# Patient Record
Sex: MALE | Race: WHITE | NOT HISPANIC OR LATINO | Employment: OTHER | URBAN - METROPOLITAN AREA
[De-identification: names, ages, dates, MRNs, and addresses within clinical notes are randomized per-mention and may not be internally consistent; named-entity substitution may affect disease eponyms.]

---

## 2018-07-09 ENCOUNTER — OFFICE VISIT (OUTPATIENT)
Dept: ORTHOPEDICS | Facility: CLINIC | Age: 38
End: 2018-07-09
Payer: MEDICAID

## 2018-07-09 VITALS
WEIGHT: 159 LBS | BODY MASS INDEX: 24.96 KG/M2 | HEIGHT: 67 IN | SYSTOLIC BLOOD PRESSURE: 108 MMHG | DIASTOLIC BLOOD PRESSURE: 63 MMHG

## 2018-07-09 DIAGNOSIS — M25.561 CHRONIC PAIN OF RIGHT KNEE: Primary | ICD-10-CM

## 2018-07-09 DIAGNOSIS — M25.561 RIGHT KNEE PAIN, UNSPECIFIED CHRONICITY: Primary | ICD-10-CM

## 2018-07-09 DIAGNOSIS — G89.29 CHRONIC PAIN OF RIGHT KNEE: Primary | ICD-10-CM

## 2018-07-09 PROCEDURE — 99999 PR PBB SHADOW E&M-NEW PATIENT-LVL III: CPT | Mod: PBBFAC,,, | Performed by: ORTHOPAEDIC SURGERY

## 2018-07-09 PROCEDURE — 99203 OFFICE O/P NEW LOW 30 MIN: CPT | Mod: PBBFAC,PN | Performed by: ORTHOPAEDIC SURGERY

## 2018-07-09 PROCEDURE — 99203 OFFICE O/P NEW LOW 30 MIN: CPT | Mod: S$PBB,,, | Performed by: ORTHOPAEDIC SURGERY

## 2018-07-09 RX ORDER — NAPROXEN 500 MG/1
500 TABLET ORAL 2 TIMES DAILY
Qty: 60 TABLET | Refills: 1 | Status: SHIPPED | OUTPATIENT
Start: 2018-07-09 | End: 2019-02-10

## 2018-07-09 NOTE — PROGRESS NOTES
"Subjective:      Patient ID: Ralf Cuenca is a 37 y.o. male.    Chief Complaint: Pain of the Right Knee      HPI: Ralf Cuenca is referred by Dr. Swan.  He is here today for initial visit with complaints of right knee pain.  He has experienced problems with their bilateral knee over the past 2 years. However, recently only the right knee causes pain. The patient reports history of injury/aggravation 2 years ago. He explains he was running from the police when he jumped a fence and fell into a ditch from which she obtained 2 deep skin wounds.  Patient reports he was treated for this, and did not have a joint infection or osteomyelitis. Pain is located medially and  anteriorly Associated symptoms include giving way and gelling. He has been treated with over the counter analgesics and physitherapy.  Patient reports symptoms improved at physical therapy.  Ambulation reportedly has not been impaired. Self care ADLs are not painful.     No past medical history on file.    Current Outpatient Prescriptions:     naproxen (NAPROSYN) 500 MG tablet, Take 1 tablet (500 mg total) by mouth 2 (two) times daily., Disp: 60 tablet, Rfl: 1  Review of patient's allergies indicates:   Allergen Reactions    Benadryl [diphenhydramine hcl]        /63   Ht 5' 7" (1.702 m)   Wt 72.1 kg (159 lb)   BMI 24.90 kg/m²     Review of Systems   Constitution: Negative for chills and fever.   Cardiovascular: Negative for chest pain and palpitations.   Respiratory: Negative for shortness of breath and wheezing.    Skin: Negative for poor wound healing and rash.   Musculoskeletal: Positive for joint pain. Negative for arthritis, falls, joint swelling, muscle weakness and stiffness.   Gastrointestinal: Negative for nausea and vomiting.   Neurological: Negative for seizures and tremors.   Psychiatric/Behavioral: Negative for altered mental status.         Objective:       Vitals:    07/09/18 1310   BP: 108/63    Ortho Exam     right " KNEE:  The patient is not in acute distress.   Body habitus is normal.   The patient walks without a limp.  Resisted SLR negative.   The skin over the knee has a healed scar posterior medially 1.5 x 1.5 cm. As well as anterior laterally 0.5 x 0.5 cm.  Knee effusion none   Tendernes is located medial and anterior  Range of motion- Flexion 145 deg, Extension 0 deg,   Ligament exam:   MCL intact   Lachman intact              Post sag intact    LCL intact  Patellar apprehension negative.  Popliteal cyst negative  Patellar crepitation present.  Flexion/pinch negative.  Pulses DP present, PT present.  Motor normal 5/5 strength in all tested muscle groups.   Sensory normal.    GEN: Well developed, well nourished male. AAOX3. No acute distress.   Breathing unlabored.  Mood and affect normal.         Assessment:     Imaging:  I personally reviewed the radiographs of the right knee which reveal no acute or chronic abnormalities.      1. Chronic pain of right knee          Plan:     Structurally the knee is stable.   There is a possibility of a meniscal tear causing these symptoms. Patient reports improvement with physical therapy in the past.  I have recommended restarting physical therapy.  If symptoms do not continue to improve we may consider MRI in the future to evaluate this.    Start Naprosyn, GI precautions given.    Orders Placed This Encounter    Ambulatory Referral to Physical/Occupational Therapy    naproxen (NAPROSYN) 500 MG tablet     Follow-up in about 2 months (around 9/9/2018).

## 2018-07-09 NOTE — LETTER
July 9, 2018      Edin Swan MD  [address] [place] [address]           [facility]  [address]  [place] [address]  Phone: [phone]  Fax: [phone]          Patient: Ralf Cuenca   MR Number: [id number]   YOB: 1980   Date of Visit: 7/9/2018       Dear Dr. Edin Swan:    Thank you for referring Ralf Cuenca to me for evaluation. Attached you will find relevant portions of my assessment and plan of care.    If you have questions, please do not hesitate to call me. I look forward to following Ralf Cuenca along with you.    Sincerely,    Karissa Callahan PA-C    Enclosure  CC:  No Recipients    If you would like to receive this communication electronically, please contact [email or web address] or [phone] to request more information on [facility] access.    For providers and/or their staff who would like to refer a patient to [facility], please contact us through our one-stop-shop provider referral line, [facility], at [phone].    If you feel you have received this communication in error or would no longer like to receive these types of communications, please e-mail [email or web address]

## 2018-11-05 ENCOUNTER — TELEPHONE (OUTPATIENT)
Dept: ORTHOPEDICS | Facility: CLINIC | Age: 38
End: 2018-11-05

## 2019-02-10 ENCOUNTER — HOSPITAL ENCOUNTER (EMERGENCY)
Facility: HOSPITAL | Age: 39
Discharge: HOME | End: 2019-02-10
Attending: EMERGENCY MEDICINE
Payer: MEDICAID

## 2019-02-10 VITALS
DIASTOLIC BLOOD PRESSURE: 72 MMHG | TEMPERATURE: 100 F | HEART RATE: 77 BPM | RESPIRATION RATE: 16 BRPM | HEIGHT: 67 IN | SYSTOLIC BLOOD PRESSURE: 120 MMHG | BODY MASS INDEX: 24.01 KG/M2 | WEIGHT: 153 LBS | OXYGEN SATURATION: 99 %

## 2019-02-10 DIAGNOSIS — M70.61 TROCHANTERIC BURSITIS OF RIGHT HIP: Primary | ICD-10-CM

## 2019-02-10 DIAGNOSIS — M25.551 RIGHT HIP PAIN: ICD-10-CM

## 2019-02-10 PROCEDURE — 96372 THER/PROPH/DIAG INJ SC/IM: CPT

## 2019-02-10 PROCEDURE — 99284 EMERGENCY DEPT VISIT MOD MDM: CPT | Mod: ,,, | Performed by: EMERGENCY MEDICINE

## 2019-02-10 PROCEDURE — 99284 EMERGENCY DEPT VISIT MOD MDM: CPT | Mod: 25

## 2019-02-10 PROCEDURE — 63600175 PHARM REV CODE 636 W HCPCS: Performed by: PHYSICIAN ASSISTANT

## 2019-02-10 PROCEDURE — 99284 PR EMERGENCY DEPT VISIT,LEVEL IV: ICD-10-PCS | Mod: ,,, | Performed by: EMERGENCY MEDICINE

## 2019-02-10 RX ORDER — KETOROLAC TROMETHAMINE 30 MG/ML
15 INJECTION, SOLUTION INTRAMUSCULAR; INTRAVENOUS
Status: COMPLETED | OUTPATIENT
Start: 2019-02-10 | End: 2019-02-10

## 2019-02-10 RX ADMIN — KETOROLAC TROMETHAMINE 15 MG: 30 INJECTION, SOLUTION INTRAMUSCULAR at 06:02

## 2019-02-10 NOTE — ED NOTES
LOC: The patient is awake, alert and aware of environment with an appropriate affect, the patient is oriented x 3 and speaking appropriately.  APPEARANCE: Patient resting comfortably and in no acute distress, patient is clean and well groomed, patient's clothing is properly fastened.  SKIN: The skin is warm and dry, color consistent with ethnicity, patient has normal skin turgor and moist mucus membranes, skin intact, no breakdown or bruising noted.  MUSCULOSKELETAL: Patient moving all extremities spontaneously, no obvious swelling or deformities noted.  Tenderness to right hip joint on palpation  RESPIRATORY: Airway is open and patent, respirations are spontaneous, patient has a normal effort and rate, no accessory muscle use noted.  NEUROLOGIC:  facial expression is symmetrical, patient moving all extremities spontaneously, normal sensation in all extremities when touched with a finger.  Follows all commands appropriately.

## 2019-02-10 NOTE — ED TRIAGE NOTES
Pt with right hip pain that started yesterday while playing with nephew.  Pt denies injury.  Pt states very difficult to walk.  Pt denies numbness/tingling to leg.  Pt states burning at the site of pain.  Pain increases with change of position.

## 2019-02-11 NOTE — ED PROVIDER NOTES
"Encounter Date: 2/10/2019       History     Chief Complaint   Patient presents with    Hip Pain     right hip pain, since yesterday, worse today, had to limp, denies trauma or injury      6:05 PM    Patient is a 38-year-old male with no significant past medical history who presents the ED with right hip pain.  Patient states that he noted his pain yesterday.  He states that at the time he was running around with his nephew, having a play-gun fight.  No significant injury or trauma.  His pain progressively became worse which prompted him to come into the ED.  He is complaining of no pain at rest, but when he moves a certain way his pain becomes severe and is "burning" in nature. His pain is located to his R lateral hip with intermittent radiation down the lateral side.  He took ibuprofen 200 mg at 1500 PTA.   He denies previous history of right hip pain. He has a hx of back pain, but not currently.  No significant family history per patient and mother. He also notes a rash for 3 weeks to his hands and feet that has been evaluated by his PCP and recently biopsied by dermatology. Suture noted in place to medial R foot.          Review of patient's allergies indicates:   Allergen Reactions    Benadryl [diphenhydramine hcl]      No past medical history on file.  No past surgical history on file.  History reviewed. No pertinent family history.  Social History     Tobacco Use    Smoking status: Current Every Day Smoker    Smokeless tobacco: Current User   Substance Use Topics    Alcohol use: Not on file    Drug use: Not on file     Review of Systems   Constitutional: Negative for fever.   HENT: Negative for sore throat.    Respiratory: Negative for shortness of breath.    Cardiovascular: Negative for chest pain.   Gastrointestinal: Negative for nausea.   Genitourinary: Negative for dysuria.   Musculoskeletal: Positive for arthralgias. Negative for back pain.   Skin: Positive for rash.   Neurological: Negative for " weakness.   Hematological: Does not bruise/bleed easily.       Physical Exam     Initial Vitals [02/10/19 1542]   BP Pulse Resp Temp SpO2   108/73 91 18 98.2 °F (36.8 °C) 98 %      MAP       --         Physical Exam    Vitals reviewed.  Constitutional: He appears well-developed and well-nourished. He is not diaphoretic. No distress.   HENT:   Head: Normocephalic and atraumatic.   Nose: Nose normal.   Eyes: Conjunctivae and EOM are normal.   Neck: Normal range of motion.   Cardiovascular: Normal rate, regular rhythm and normal heart sounds. Exam reveals no friction rub.    No murmur heard.  Pulses:       Dorsalis pedis pulses are 2+ on the right side, and 2+ on the left side.   Pulmonary/Chest: Breath sounds normal. No respiratory distress. He has no wheezes. He has no rales.   Abdominal: Soft. Bowel sounds are normal. He exhibits no distension. There is no tenderness.   Musculoskeletal: Normal range of motion.        Right hip: He exhibits tenderness. He exhibits normal range of motion, normal strength, no bony tenderness, no swelling and no deformity.        Legs:  R hip pain exacerbated with abduction which limited ROM, otherwise intact in other directions. Skin without erythema, ecchymosis, rashes, wounds, warmth.   Neurological: He is alert and oriented to person, place, and time. He has normal strength. No sensory deficit.   Skin: Skin is warm and dry. Rash noted. No erythema.   Rash noted to bilateral hands and feet. 1 suture noted to medial R foot. No signs of cellulitis.    Psychiatric: He has a normal mood and affect. Thought content normal.         ED Course   Procedures  Labs Reviewed - No data to display       Imaging Results          X-Ray Hip 2 View Right (Final result)  Result time 02/10/19 19:34:07    Final result by Tino Wise MD (02/10/19 19:34:07)                 Impression:      No acute bony abnormality.      Electronically signed by: Tino Wise  MD  Date:    02/10/2019  Time:    19:34             Narrative:    EXAMINATION:  XR HIP 2 VIEW RIGHT    CLINICAL HISTORY:  Pain in right hip.    TECHNIQUE:  AP view of the pelvis and frog leg lateral view of the right hip were performed.    COMPARISON:  None.    FINDINGS:  No evidence of acute fracture or dislocation.  Hips are symmetric.  No radiopaque foreign body.                                 Medical Decision Making:   History:   Old Medical Records: I decided to obtain old medical records.  Initial Assessment:   Patient is a 38-year-old male that presents the ED with right lateral hip pain worse with movement.  No significant injury or trauma.  He was playing with his nephew when he started to developed his pain.  Differential Diagnosis:   Includes but is not limited to bursitis, ITB band syndrome, and less likely fractures. Low suspicion for AVN given that pain is only appreciated with certain movements. No pain at rest.   Clinical Tests:   Radiological Study: Ordered and Reviewed  ED Management:  X-rays shows no evidence of acute fracture or dislocation.  Hips are symmetric.  No radiopaque foreign body.    Patient updated with results.  His symptoms most likely due to bursitis.  He was given a Toradol IM injection here for anti-inflammatory effects.  He is to continue ibuprofen on a scheduled basis for pain relief.  Follow up with sports medicine if symptoms do not improved.  All questions were answered.  Patient is comfortable with plan and stable for discharge.      I have reviewed patient's chart and discussed this case with my supervising MD.     Lita Oneal PA-C  Emergent Department  [facility]  Spectralink #[id number] or #[id number]                        Clinical Impression:   The primary encounter diagnosis was Trochanteric bursitis of right hip. A diagnosis of Right hip pain was also pertinent to this visit.      Disposition:   Disposition: Discharged  Condition: Stable                         Lita Oneal PA-C  02/11/19 0120

## 2019-02-11 NOTE — DISCHARGE INSTRUCTIONS
Continue ibuprofen 400 mg every 6 hours as needed for pain relief. Rest. Avoid activity that exacerbates pain, but avoid prolong bed rest.     Call and follow up with sports medicine if your symptoms do not improve or worsen in 7 days or return to the emergency department for any concerning signs or symptoms.    No future appointments.    Our goal in the emergency department is to always give you outstanding care and exceptional service. You may receive a survey by mail or e-mail in the next week regarding your experience in our ED. We would greatly appreciate your completing and returning the survey. Your feedback provides us with a way to recognize our staff who give very good care and it helps us learn how to improve when your experience was below our aspiration of excellence.

## 2022-06-09 ENCOUNTER — TELEPHONE (OUTPATIENT)
Dept: ORTHOPEDICS | Facility: CLINIC | Age: 42
End: 2022-06-09
Payer: MEDICAID

## 2022-06-09 DIAGNOSIS — M25.531 RIGHT WRIST PAIN: Primary | ICD-10-CM

## 2022-06-09 NOTE — PROGRESS NOTES
"Subjective:      Patient ID: Ralf Cuenca is a 41 y.o. male.    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 Pain has improved since injury. He has intermittent ulnar sided right wrist pain that is worse with flexion. He notes clicking and popping in his wrist. Pain is worse also with lifting. No numbness or tingling. He has stiffness in the fingers. He is right hand dominant.     He has been wearing a splint and wrapping wrist with ACE. He is babying his right arm. No OT, injections, or surgery on his right wrist. He has taken motrin with some relief.       History reviewed. No pertinent past medical history.      Current Outpatient Medications:     ibuprofen (ADVIL,MOTRIN) 800 MG tablet, Take 1 tablet (800 mg total) by mouth 3 (three) times daily after meals., Disp: 90 tablet, Rfl: 2    Review of patient's allergies indicates:   Allergen Reactions    Benadryl [diphenhydramine hcl]        Review of Systems   Constitutional: Negative for chills, fever, night sweats and weight gain.   Gastrointestinal: Negative for bowel incontinence, nausea and vomiting.   Genitourinary: Negative for bladder incontinence.   Neurological: Negative for disturbances in coordination and loss of balance.         Objective:        Ht 5' 7" (1.702 m)   Wt 71.9 kg (158 lb 9.6 oz)   BMI 24.84 kg/m²     General    Vitals reviewed.  Constitutional: He is oriented to person, place, and time. He appears well-developed and well-nourished.   Pulmonary/Chest: Effort normal.   Abdominal: He exhibits no distension.   Neurological: He is alert and oriented to person, place, and time.   Psychiatric: He has a normal mood and affect. His behavior is normal. Judgment and thought content normal.           RIGHT Hand/Wrist Examination:    Observation/Inspection:  Swelling  none    Deformity  none  Discoloration  none "     Scars   none    Atrophy  none    HAND/WRIST EXAMINATION:  Finkelstein's Test   Neg  WHAT Test    Neg  Snuff box tenderness   Neg  Hook of Hamate Tenderness  Neg  CMC grind    Neg    Neurovascular Exam:  Digits WWP, brisk CR < 3s throughout  NVI motor/LTS to M/R/U nerves, radial pulse 2+  Tinel's Test - Carpal Tunnel  Neg  Tinel's Test - Cubital Tunnel  Neg  Phalen's Test    Neg  Median Nerve Compression Test Neg    ROM hand full, painless    Reasonable ROM of wrist with pain at extremes of flexion/extension. Ulnar sided wrist tenderness.       LEFT Hand/Wrist Examination:    Observation/Inspection:  Swelling  none    Deformity  none  Discoloration  none     Scars   none    Atrophy  none    HAND/WRIST EXAMINATION:  Finkelstein's Test   Neg  WHAT Test    Neg  Snuff box tenderness   Neg  Hook of Hamate Tenderness  Neg  CMC grind    Neg    Neurovascular Exam:  Digits WWP, brisk CR < 3s throughout  NVI motor/LTS to M/R/U nerves, radial pulse 2+  Tinel's Test - Carpal Tunnel  Neg  Tinel's Test - Cubital Tunnel  Neg  Phalen's Test    Neg  Median Nerve Compression Test Neg    ROM hand/wrist/elbow full, painless      XRAY INTERPRETATION:   X-rays of right wrist dated 6/10/22 are personally reviewed and show no fracture or dislocation.         Assessment:       Encounter Diagnosis   Name Primary?    Acute pain of right wrist           Plan:       Ralf was seen today for pain.    Diagnoses and all orders for this visit:    Acute pain of right wrist  -     Ambulatory referral/consult to Orthopedics  -     ibuprofen (ADVIL,MOTRIN) 800 MG tablet; Take 1 tablet (800 mg total) by mouth 3 (three) times daily after meals.  -     Ambulatory referral/consult to Physical/Occupational Therapy; Future      4 week history of intermittent ulnar sided right wrist pain that started after he slipped off the back of a [employer] trailer. He has ulnar sided right wrist pain and clicking. He his right hand dominant.     XR of right wrist show no  fracture or dislocation. He has ulnar sided tenderness.     Treatment options reviewed with patient along with above right wrist xrays. Following plan made:      - OT orders for right wrist sent to [facility].   - New prescription for motrin 800mg. Reviewed dosing and side effects. Take with food. Will take bid to tid regularly for next 2 weeks.   - Given gel wrist brace to use prn at home. 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   - If no improvement with above, consider MRI of right wrist to evaluate for TFCC tear.     Follow up in about 6 weeks (around 7/22/2022).

## 2022-06-09 NOTE — TELEPHONE ENCOUNTER
Spoke with pt informing him need of xray prior to visit with Bruna  Tomorrow. Pt states he had xrays completed about 2 weeks ago with Dr Fernandez and will get the records from this and bring with him to appointment. If not he will have them completed tomorrow.     ----- Message from Bruna Larson PA-C sent at 6/9/2022  2:00 PM CDT -----  He has appt with me tomorrow and needs right wrist XRs prior to seeing me. I have ordered them.     Thanks.

## 2022-06-10 ENCOUNTER — OFFICE VISIT (OUTPATIENT)
Dept: ORTHOPEDICS | Facility: CLINIC | Age: 42
End: 2022-06-10
Payer: MEDICAID

## 2022-06-10 VITALS — BODY MASS INDEX: 24.9 KG/M2 | HEIGHT: 67 IN | WEIGHT: 158.63 LBS

## 2022-06-10 DIAGNOSIS — M25.531 ACUTE PAIN OF RIGHT WRIST: ICD-10-CM

## 2022-06-10 PROCEDURE — 99203 PR OFFICE/OUTPT VISIT, NEW, LEVL III, 30-44 MIN: ICD-10-PCS | Mod: S$PBB,,, | Performed by: PHYSICIAN ASSISTANT

## 2022-06-10 PROCEDURE — 99999 PR PBB SHADOW E&M-EST. PATIENT-LVL IV: CPT | Mod: PBBFAC,,, | Performed by: PHYSICIAN ASSISTANT

## 2022-06-10 PROCEDURE — 1160F PR REVIEW ALL MEDS BY PRESCRIBER/CLIN PHARMACIST DOCUMENTED: ICD-10-PCS | Mod: CPTII,,, | Performed by: PHYSICIAN ASSISTANT

## 2022-06-10 PROCEDURE — 1159F PR MEDICATION LIST DOCUMENTED IN MEDICAL RECORD: ICD-10-PCS | Mod: CPTII,,, | Performed by: PHYSICIAN ASSISTANT

## 2022-06-10 PROCEDURE — 3008F BODY MASS INDEX DOCD: CPT | Mod: CPTII,,, | Performed by: PHYSICIAN ASSISTANT

## 2022-06-10 PROCEDURE — 1160F RVW MEDS BY RX/DR IN RCRD: CPT | Mod: CPTII,,, | Performed by: PHYSICIAN ASSISTANT

## 2022-06-10 PROCEDURE — 99203 OFFICE O/P NEW LOW 30 MIN: CPT | Mod: S$PBB,,, | Performed by: PHYSICIAN ASSISTANT

## 2022-06-10 PROCEDURE — 99999 PR PBB SHADOW E&M-EST. PATIENT-LVL IV: ICD-10-PCS | Mod: PBBFAC,,, | Performed by: PHYSICIAN ASSISTANT

## 2022-06-10 PROCEDURE — 1159F MED LIST DOCD IN RCRD: CPT | Mod: CPTII,,, | Performed by: PHYSICIAN ASSISTANT

## 2022-06-10 PROCEDURE — 99214 OFFICE O/P EST MOD 30 MIN: CPT | Mod: PBBFAC,PN | Performed by: PHYSICIAN ASSISTANT

## 2022-06-10 PROCEDURE — 3008F PR BODY MASS INDEX (BMI) DOCUMENTED: ICD-10-PCS | Mod: CPTII,,, | Performed by: PHYSICIAN ASSISTANT

## 2022-06-10 RX ORDER — IBUPROFEN 800 MG/1
800 TABLET ORAL
Qty: 90 TABLET | Refills: 2 | Status: SHIPPED | OUTPATIENT
Start: 2022-06-10 | End: 2022-08-05 | Stop reason: SDUPTHER

## 2022-06-10 NOTE — PATIENT INSTRUCTIONS
It was nice to meet you today! I am sorry that you are hurting.     Your wrist xrays are normal. You may have a soft tissue injury to the wrist that does not show up on xray.     Wear the wrist  brace at work. You can wear as needed for pain when not working.     I sent motrin 800mg to your pharmacy to help with pain/inflammation. Take as directed with food. I would try to take at least twice a day for the next 2 weeks.     I sent occupational therapy orders to [facility]. They should call you to set up, but if not you can call [phone].     If no improvement with above, we can consider an MRI of your wrist.     I will see you back in 6 weeks, but please stay in touch and call me if you need anything. You can also send me a message in [facility].     Bruna   [phone]

## 2022-06-13 ENCOUNTER — CLINICAL SUPPORT (OUTPATIENT)
Dept: REHABILITATION | Facility: HOSPITAL | Age: 42
End: 2022-06-13
Payer: MEDICAID

## 2022-06-13 DIAGNOSIS — R29.898 DECREASED PINCH STRENGTH: ICD-10-CM

## 2022-06-13 DIAGNOSIS — M25.631 DECREASED RANGE OF MOTION OF RIGHT WRIST: ICD-10-CM

## 2022-06-13 DIAGNOSIS — R29.898 DECREASED GRIP STRENGTH OF RIGHT HAND: ICD-10-CM

## 2022-06-13 DIAGNOSIS — M25.531 ACUTE PAIN OF RIGHT WRIST: ICD-10-CM

## 2022-06-13 PROCEDURE — 97165 OT EVAL LOW COMPLEX 30 MIN: CPT | Mod: PN

## 2022-06-13 NOTE — PATIENT INSTRUCTIONS
[facility], OCCUPATIONAL THERAPY  HOME EXERCISE PROGRAM       Complete the following exercises with 10 repetitions each, 2 x/day.     AROM: Supination / Pronation   With your elbow by your side, turn your palm up then turn your palm down.     AROM: Wrist Flexion / Extension               Bend your wrist forward and back as far as possible.      AROM: Wrist Radial / Ulnar Deviation  Bend your wrist from side to side as far as possible.    AROM: Wrist Flexion / Extension  Make a fist, then bend your wrist forward then back as far as possible.         AROM: Wrist Radial / Ulnar Deviation   Make a fist then bend your wrist toward your body, then away.         Copyright © I. All rights reserved.     Therapist: SANTY Martini

## 2022-06-14 NOTE — PLAN OF CARE
[facility] Occupational Therapy  Initial Evaluation     Date:  6/13/2022    Name: Ralf Cuenca  Clinic Number: [id number]    Therapy Diagnosis:   1. Acute pain of right wrist  Ambulatory referral/consult to Physical/Occupational Therapy   2. Decreased  strength of right hand     3. Decreased pinch strength     4. Decreased range of motion of right wrist        Physician: Bruna Larson PA-C     Physician Orders:  Eval and treat with all modalities. Good HEP. 1-2 x per week x 6 weeks.  Medical Diagnosis: Acute pain of right wrist-OT for ulnar sided right wrist pain s/p fall a month ago.  Surgical Procedure and Date: n/a  Evaluation Date: 06/13/2022    Insurance Authorization Period Expiration: tbd  Plan of Care Certification Period: 6/13/22 to 8/22/22  Date of Return to MD: 7/22/22    Visit # / Visits authorized: 1 / 1    Time In:12:13 pm  Time Out: 1:00 pm  Total Billable Time: 47 minutes    Precautions:  Standard    Subjective     Involved Side: right   Any pressure on my hand hurts.   Dominant Side: Right  Date of Onset: over a month ago  Mechanism of Injury: fell off a box truck not sure how I fell on it.   History of Current Condition:  I have been taking the medicine which has helped but if I don't take it it hurts  Surgical Procedure: n/a  Imaging: xray   FINDINGS:  The bones are intact.  There is no evidence for acute fracture or bone destruction.  Joint spaces appear well maintained.  No bony erosions are identified.  Soft tissues are unremarkable.  Impression:  No evidence for acute fracture, bone destruction, or dislocation.  Electronically signed by: Marino Mejia MD  Date:                                            06/10/2022    Previous Therapy: none    Past Medical History/Physical Systems Review:   Ralf Cuenca  has no past medical history on file.    Ralf Cuenca  has no past surgical history on file.    Ralf has a current medication list which includes the following  prescription(s): ibuprofen.    Review of patient's allergies indicates:   Allergen Reactions    Benadryl [diphenhydramine hcl]         Patient's Goals for Therapy:  See if I can get my hand better.     Pain:  Functional Pain Scale Rating 0-10:   5/10 on average  0/10 at best  7/10 at worst  Location: ulnar wrist   Description: stiff  Aggravating Factors: deviation  Easing Factors: splint, putting pressure on it    Occupation:  [employer], seafood, [employer]  Working presently: self-employed  Duties: all physical work     Functional Limitations/Social History:    Previous functional status includes: Independent with all ADLs.     Current FunctionalStatus   Home/Living environment : lives with their family      Limitation of Functional Status as follows:   ADLs/IADLs:     - Feeding: modified    - Bathing: modified    - Dressing/Grooming: I but careful    - Driving: I     Leisure: throw darts, go to [place]    Objective    Observation: Skin intact and no Deformities noted    Sensation: Ulnar and Median Nerve  Intact  [place] Mauro Monofilament Test: No  Stereognosis: Intact    Special Tests:   Positive TFCC stress test and Press test    Wound Assessment: n/a    Edema: Circumferential measurements: none noted in wrist and hand    Range of Motion: right Active WFL  Thumb Opposition: to all tips and 5th MCP head  Palmar Abduction: symmetric  Radial Abduction: symmetric                             Left      Right   Wrist Ext/Flex: 75/65     70/50  Wrist RD/UD: 20/35     20/25  Supination/Pronation: sym/85 sym/75  Elbow extension/flexion: WFL/WFL    Manual Muscle Test:     Wrist Left Right   Flexion 5/5 4+/5   Extension 5/5 4+/5   Radial Deviation 4+/5 4/5   Ulnar Deviation 4+/5 4/5   Supination 4+/5 4-/5   Pronation 4+/5 4/5   Pain with resistive right wrist supination and deviations     Strength: (QUINN Dynamometer in lbs.) Average 3 trials, Position II  Right: 63.3#  Left: 90.2#    Pinch  Strength: (Pinch Gauge in psi's), Average 3 trials  3pt Pinch   R) 9.6 psi's  L) 18.3psi's      CMS Impairment/Limitation/Restriction for FOTO wrist Survey    Therapist reviewed FOTO scores for Ralf Cuenca on 6/13/2022.   FOTO documents entered into [employer] - see Media section.    Limitation Score: 49%         Treatment     Home Exercise Program/Education:  Issued HEP (see patient instructions in EMR) and educated on modality use for pain management . Exercises were reviewed and Ralf was able to demonstrate them prior to the end of the session.   Pt received a written copy of exercises to perform at home. Ralf demonstrated good  understanding of the education provided.  Pt was advised to perform these exercises free of pain, and to stop performing them if pain occurs.    Patient/Family Education: role of OT, goals for OT, scheduling/cancellations - pt verbalized understanding. Discussed insurance limitations with patient.    Additional Education provided: importance of taking his medicine as prescribed(anti inflammatory), avoiding painful activities with work, use of wrist brace with work, ice for pain.     Assessment     Ralf Cuenca is a 41 y.o. male referred to outpatient occupational therapy and presents with a medical diagnosis of Acute pain of right wrist, resulting in Decreased ROM, Decreased  strength, Decreased pinch strength, Decreased muscle strength and Increased pain and demonstrates limitations as described in the chart below. Following medical record review it is determined that pt will benefit from occupational therapy services in order to maximize pain free and/or functional use of right wrist. The following goals were discussed with the patient and patient is in agreement with them as to be addressed in the treatment plan. The patient's rehab potential is Fair.     Anticipated barriers to occupational therapy: self employed   Pt has no cultural, educational or language barriers to  learning provided.    Profile and History Assessment of Occupational Performance Level of Clinical Decision Making Complexity Score   Occupational Profile:   Ralf Cuenca is a 41 y.o. 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 Ralf Cuenca has difficulty with modifications for  feeding, bathing, dressing and and using splint  affecting his/her daily functional abilities. His/her main goal for therapy is See if I can get my hand better..     Comorbidities:   none reported    Medical and Therapy History Review:   Brief               Performance Deficits    Physical:  Joint Mobility  Muscle Power/Strength  Muscle Endurance   Strength  Pinch Strength  Pain    Cognitive:  No Deficits    Psychosocial:    No Deficits     Clinical Decision Making:  low    Assessment Process:  Problem-Focused Assessments    Modification/Need for Assistance:  Minimal-Moderate Modifications/Assistance    Intervention Selection:  Several Treatment Options       low  Based on PMHX, co morbidities , data from assessments and functional level of assistance required with task and clinical presentation directly impacting function.       The following goals were discussed with the patient and patient is in agreement with them as to be addressed in the treatment plan.     Goals:   Short term goals to be met in 4-5 weeks (7/18/22)  Patient to be IND with HEP and modalities for pain/edema managment.   Increase right wrist AROM 10 degrees to increase functional hand use for ADLs/work/leisure activities.  Increase  strength 10 lbs. to improve functional grasp for ADLs/work/leisure activities.   Increase pinch 3 psi's to increase IND with package opening.   Decrease complaints of pain to  5 out of 10 to increase functional hand use for ADL/work/leisure activities.    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  Pt will score 38% on Foto survey    Plan   Certification Period/Plan of care expiration: 6/13/2022 to 8/22/22.    Outpatient Occupational Therapy 2 times weekly for 10 weeks to include the following interventions: Paraffin, Fluidotherapy, Manual therapy/joint mobilizations, Modalities for p.sfgain management, US 3 mhz, Therapeutic exercises/activities. and Strengthening.      SANTY Martini    I certify the need for these services furnished under this plan of treatment and while under my care    ____________________________________  Physician/Referring Practitioner    _______________  Date of Signature

## 2022-06-21 NOTE — PROGRESS NOTES
[facility]  Occupational Therapy Treatment Note    Date: 6/22/2022  Name: Ralf Cuenca  Clinic Number: [id number]    Therapy Diagnosis:   Encounter Diagnoses   Name Primary?    Decreased  strength of right hand Yes    Decreased pinch strength     Decreased range of motion of right wrist      Physician: Bruna Larson PA-C    PPhysician Orders:  Eval and treat with all modalities. Good HEP. 1-2 x per week x 6 weeks.  Medical Diagnosis: Acute pain of right wrist-OT for ulnar sided right wrist pain s/p fall a month ago.  Surgical Procedure and Date: n/a  Evaluation Date: 06/13/2022     Insurance Authorization Period Expiration: tbd  Plan of Care Certification Period: 6/13/22 to 8/22/22  Date of Return to MD: 7/22/22     Visit # / Visits authorized: 2 / tbd     Time In: 3:02 pm  Time Out: 3:49 pm  Total Billable Time: 42 minutes     Precautions:  Standard    SUBJECTIVE     Pt reports: I added some exercises of my own to my exercise program.   was compliant with home exercise program given last session.   Response to previous treatment:  I don't remember  Functional change: none reported    Pain: 8/10  Location: right ulnar side of wrist      OBJECTIVE     Ralf received the following direct contact modalities after being cleared for contraindications for 8 minutes:  -Patient receives ultrasound  for pain control and decreased inflammation @ continuous duty cycle, 3.3 Mhz, applied to ulnar wrist , intensity = 0.6 w/cm2 for 8 minutes.    Ralf received therapeutic exercises/therapeutic activities for 34 minutes including:  -friction massage to ulnar wrist until decrease in discomfort  -gentle stretch of wrist extension and UD/RD with avoidance to painful region  -kinesiotape applied for support of ulnar wrist which reduced pain   -ergo gripper 1 red band x 3 min - partial  to avoid wrist extension.      Ice applied to wrist after contraindications cleared.     Patient Education  and Home Exercises      Education provided:   - perform provided HEP only!  - Progress towards goals     Written Home Exercises Provided: Patient instructed to cont prior HEP.  Exercises were reviewed and Ralf was able to demonstrate them prior to the end of the session.  Ralf demonstrated good  understanding of the HEP provided. See EMR under Patient Instructions for exercises provided during therapy sessions.       Assessment     Pt would continue to benefit from skilled OT. Ralf with some improvement in his pain post therapy.  Kinesiotape supporting ulnar right wrist reduced his pain.  Poor judgement to add exercises teri his HEP.       Rafl is progressing slowly towards his goals and there are no updates to goals at this time. Pt prognosis is Good.     Pt will continue to benefit from skilled outpatient occupational therapy to address the deficits listed in the problem list on initial evaluation provide pt/family education and to maximize pt's level of independence in the home and community environment.     Pt's spiritual, cultural and educational needs considered and pt agreeable to plan of care and goals.    Anticipated barriers to occupational therapy:  Creating his own exercises    Goals:  Short term goals to be met in 4-5 weeks (7/18/22)  Patient to be IND with HEP and modalities for pain/edema managment.   Increase right wrist AROM 10 degrees to increase functional hand use for ADLs/work/leisure activities.  Increase  strength 10 lbs. to improve functional grasp for ADLs/work/leisure activities.   Increase pinch 3 psi's to increase IND with package opening.   Decrease complaints of pain to  5 out of 10 to increase functional hand use for ADL/work/leisure activities.     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  Pt will score 38% on Foto survey    PLAN     Use of modalities, manual treatment, and activities to address his RIM and strength issues.    Updates/Grading for next session: pending improvement in pain.       SANTY Martini

## 2022-06-22 ENCOUNTER — CLINICAL SUPPORT (OUTPATIENT)
Dept: REHABILITATION | Facility: HOSPITAL | Age: 42
End: 2022-06-22
Payer: MEDICAID

## 2022-06-22 DIAGNOSIS — R29.898 DECREASED GRIP STRENGTH OF RIGHT HAND: Primary | ICD-10-CM

## 2022-06-22 DIAGNOSIS — M25.631 DECREASED RANGE OF MOTION OF RIGHT WRIST: ICD-10-CM

## 2022-06-22 DIAGNOSIS — R29.898 DECREASED PINCH STRENGTH: ICD-10-CM

## 2022-06-22 PROCEDURE — 97530 THERAPEUTIC ACTIVITIES: CPT | Mod: PN

## 2022-06-28 NOTE — PROGRESS NOTES
[facility]  Occupational Therapy Treatment Note    Date: 6/29/2022  Name: Ralf Cuenca  Clinic Number: [id number]    Therapy Diagnosis:   Encounter Diagnoses   Name Primary?    Decreased  strength of right hand Yes    Decreased pinch strength     Decreased range of motion of right wrist      Physician: Bruna Larson PA-C    PPhysician Orders:  Eval and treat with all modalities. Good HEP. 1-2 x per week x 6 weeks.  Medical Diagnosis: Acute pain of right wrist-OT for ulnar sided right wrist pain s/p fall a month ago.  Surgical Procedure and Date: n/a  Evaluation Date: 06/13/2022     Insurance Authorization Period Expiration: tbd  Plan of Care Certification Period: 6/13/22 to 8/22/22  Date of Return to MD: 7/22/22     Visit # / Visits authorized: 3 / tbd     Time In: 3:20 pm  Time Out: 4:00 pm  Total Billable Time: 40 minutes     Precautions:  Standard    SUBJECTIVE     Pt reports: I had to work on my car so my wrist still hurts   was compliant with home exercise program given last session.   Response to previous treatment:  Doesn't recall  Functional change: none reported    Pain: 8/10 pre therapy ,   0/10 after ice   Location: right ulnar side of wrist      OBJECTIVE     Ralf received the following direct contact modalities after being cleared for contraindications for 8 minutes:  -Patient receives ultrasound  for pain control and decreased inflammation @ continuous duty cycle, 3.3 Mhz, applied to ulnar wrist , intensity = 0.6 w/cm2 for 8 minutes.    Arlf received therapeutic exercises/therapeutic activities for 32 minutes including:  -friction massage to ulnar wrist until decrease in discomfort  -gentle stretch of wrist extension and UD/RD   -wrist extension from table top  -kinesiotape applied for support of ulnar wrist which reduced pain   -ergo gripper 1 red band (over 3 prongs) x 3 min     -yellow clip 3pt pinch x 1.5 min    Ice applied to wrist after contraindications  cleared 5 minutes.     Patient Education and Home Exercises      Education provided:   - perform provided HEP only!  - Progress towards goals     Written Home Exercises Provided: Patient instructed to cont prior HEP.  Exercises were reviewed and Ralf was able to demonstrate them prior to the end of the session.  Ralf demonstrated good  understanding of the HEP provided. See EMR under Patient Instructions for exercises provided during therapy sessions.       Assessment     Pt would continue to benefit from skilled OT. Ralf report improvement in wrist pain with kinesiotape for ulnar support.  Pt performed mechanical work with right hand last week which increase his pain.  No pain post ice application     Ralf is progressing slowly towards his goals and there are no updates to goals at this time. Pt prognosis is Good.     Pt will continue to benefit from skilled outpatient occupational therapy to address the deficits listed in the problem list on initial evaluation provide pt/family education and to maximize pt's level of independence in the home and community environment.     Pt's spiritual, cultural and educational needs considered and pt agreeable to plan of care and goals.    Anticipated barriers to occupational therapy:  Creating his own exercises    Goals:  Short term goals to be met in 4-5 weeks (7/18/22)  Patient to be IND with HEP and modalities for pain/edema managment.   Increase right wrist AROM 10 degrees to increase functional hand use for ADLs/work/leisure activities.  Increase  strength 10 lbs. to improve functional grasp for ADLs/work/leisure activities.   Increase pinch 3 psi's to increase IND with package opening.   Decrease complaints of pain to  5 out of 10 to increase functional hand use for ADL/work/leisure activities.     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  Pt will score 38% on Foto survey    PLAN     Use of modalities, manual treatment, and activities to address his RIM and strength issues.    Updates/Grading for next session: pending improvement in pain.       SANTY Martini

## 2022-06-29 ENCOUNTER — CLINICAL SUPPORT (OUTPATIENT)
Dept: REHABILITATION | Facility: HOSPITAL | Age: 42
End: 2022-06-29
Payer: MEDICAID

## 2022-06-29 DIAGNOSIS — R29.898 DECREASED PINCH STRENGTH: ICD-10-CM

## 2022-06-29 DIAGNOSIS — M25.631 DECREASED RANGE OF MOTION OF RIGHT WRIST: ICD-10-CM

## 2022-06-29 DIAGNOSIS — R29.898 DECREASED GRIP STRENGTH OF RIGHT HAND: Primary | ICD-10-CM

## 2022-06-29 PROCEDURE — 97530 THERAPEUTIC ACTIVITIES: CPT | Mod: PN

## 2022-07-06 ENCOUNTER — CLINICAL SUPPORT (OUTPATIENT)
Dept: REHABILITATION | Facility: HOSPITAL | Age: 42
End: 2022-07-06
Payer: MEDICAID

## 2022-07-06 DIAGNOSIS — M25.631 DECREASED RANGE OF MOTION OF RIGHT WRIST: ICD-10-CM

## 2022-07-06 DIAGNOSIS — R29.898 DECREASED GRIP STRENGTH OF RIGHT HAND: Primary | ICD-10-CM

## 2022-07-06 DIAGNOSIS — R29.898 DECREASED PINCH STRENGTH: ICD-10-CM

## 2022-07-06 PROCEDURE — 97530 THERAPEUTIC ACTIVITIES: CPT | Mod: PN

## 2022-07-06 NOTE — PROGRESS NOTES
[facility]  Occupational Therapy Treatment Note    Date: 7/6/2022  Name: Rlaf Cuenca  Clinic Number: [id number]    Therapy Diagnosis:   Encounter Diagnoses   Name Primary?    Decreased  strength of right hand Yes    Decreased pinch strength     Decreased range of motion of right wrist      Physician: Bruna Larson PA-C    PPhysician Orders:  Eval and treat with all modalities. Good HEP. 1-2 x per week x 6 weeks.  Medical Diagnosis: Acute pain of right wrist-OT for ulnar sided right wrist pain s/p fall a month ago.  Surgical Procedure and Date: n/a  Evaluation Date: 06/13/2022     Insurance Authorization Period Expiration: tbd  Plan of Care Certification Period: 6/13/22 to 8/22/22  Date of Return to MD: 7/22/22     Visit # / Visits authorized: 4 / tbd     Time In: 1:34 pm  Time Out: 2:29 pm  Total Billable Time: 40 minutes     Precautions:  Standard    SUBJECTIVE     Pt reports: I had some pain relief but it is still sore in my wrist mostly side of my wrist  Ralf was compliant with home exercise program given last session.   Response to previous treatment:  I had brief relief after therapy  Functional change: none reported    Pain: 7/10 pre therapy ,   0/10 after ice   Location: right ulnar side of wrist      OBJECTIVE     Ralf received the following direct contact modalities after being cleared for contraindications for 8 minutes:  -Patient receives ultrasound  for pain control and decreased inflammation @ continuous duty cycle, 3.3 Mhz, applied to ulnar wrist , intensity = 0.6 w/cm2 for 8 minutes.    Ralf received therapeutic exercises/therapeutic activities for 32 minutes including:  -IASTM with multi tool to FCU/ECU  -friction massage to ulnar wrist until decrease in discomfort  -active wrist flexion/extension off wedge x 10 reps  -active sup/pronation x 15 reps  -wrist extension from table top x 10 reps  -kinesiotape applied for support of ulnar wrist which reduced  pain   -gripping with salmon and yellow putty   -wrist flexion in neutral forearm x 10 reps    Ice applied to wrist after contraindications cleared  5 minutes.     Patient Education and Home Exercises      Education provided:   - perform provided HEP only!  -avoid any lifting with right hand  - Progress towards goals     Written Home Exercises Provided: Patient instructed to cont prior HEP.  Exercises were reviewed and Ralf was able to demonstrate them prior to the end of the session.  Ralf demonstrated good  understanding of the HEP provided. See EMR under Patient Instructions for exercises provided during therapy sessions.       Assessment     Pt would continue to benefit from skilled OT. Ralf with less pain in wrist with kinesiotape in place. Good tolerance to resistive putty with wrist supported by tape.   Pt performed mechanical work with right hand last week which increase his pain.  No pain post ice application     Ralf is progressing slowly towards his goals and there are no updates to goals at this time. Pt prognosis is Good.     Pt will continue to benefit from skilled outpatient occupational therapy to address the deficits listed in the problem list on initial evaluation provide pt/family education and to maximize pt's level of independence in the home and community environment.     Pt's spiritual, cultural and educational needs considered and pt agreeable to plan of care and goals.    Anticipated barriers to occupational therapy:  Creating his own exercises    Goals:  Short term goals to be met in 4-5 weeks (7/18/22)  Patient to be IND with HEP and modalities for pain/edema managment.   Increase right wrist AROM 10 degrees to increase functional hand use for ADLs/work/leisure activities.  Increase  strength 10 lbs. to improve functional grasp for ADLs/work/leisure activities.   Increase pinch 3 psi's to increase IND with package opening.   Decrease complaints of pain to  5 out of 10 to increase  functional hand use for ADL/work/leisure activities.     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  Pt will score 38% on Foto survey    PLAN     Use of modalities, manual treatment, and activities to address his RIM and strength issues.    Updates/Grading for next session: pending improvement in pain.       SANTY Martini

## 2022-07-06 NOTE — PATIENT INSTRUCTIONS
[facility]  OCCUPATIONAL THERAPY  HOME EXERCISE PROGRAM     Complete the following strengthening program 1x/day.                     Squeeze putty 2-3 minutes with tape on     SANTY Martini

## 2022-07-12 NOTE — PROGRESS NOTES
[facility]  Occupational Therapy Progress / Treatment Note    Date: 7/13/2022  Name: Ralf Cuenca  Clinic Number: [id number]    Therapy Diagnosis:   Encounter Diagnoses   Name Primary?    Decreased  strength of right hand Yes    Decreased pinch strength     Decreased range of motion of right wrist      Physician: Bruna Larson PA-C    PPhysician Orders:  Eval and treat with all modalities. Good HEP. 1-2 x per week x 6 weeks.  Medical Diagnosis: Acute pain of right wrist-OT for ulnar sided right wrist pain s/p fall a month ago.  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   was compliant with home exercise program given last session.   Response to previous treatment: doesn't remember  Functional change: none reported    Pain: 6/10 pre therapy ,   0/10 after ice   Location: right ulnar side of wrist      OBJECTIVE     Ralf received therapeutic exercises/therapeutic activities for 38 minutes including:  Re assessment:                            Left  6/13/22  Right              Right 7/13/22  Wrist Ext/Flex: 75/65     70/50                         70/60  Wrist RD/UD: 20/35     20/25                          20/35  Supination/Pronation: sym/85 sym/75            Sym/90  Elbow extension/flexion: WFL/WFL       Strength: (QUINN Dynamometer in lbs.) Average 3 trials, Position II         6/13/22 7/13/22  Right: 63.3#                 79.5 #  Left: 90.2#      Pinch Strength: (Pinch Gauge in psi's), Average 3 trials  3pt Pinch 6/13/22  R) 9.6 psi's  L) 18.3psi's                  7/13/22  R) 19.3  psi     -IASTM with multi tool to extensor muscle   -friction massage to ulnar wrist and dorsal wrist until decrease in discomfort  -wrist extension from table top x 10 reps             Flexion in neutral forearm x 10 reps  -wrist ext/flexion off wedge x 15 reps  -short range 1# weight wrist ext/flexion off wedge x 10 reps                                        Wrist deviation off wedge x 10 reps      -ergo gripper 1 red band (over 3 prongs) x 4 min     -yellow clip 3pt pinch x 1.5 min    -kinesiotape applied for support of ulnar wrist which reduced pain   Ice applied to wrist after contraindications cleared 5 minutes.     Patient Education and Home Exercises      Education provided:   - perform provided HEP only!  - Progress towards goals     Written Home Exercises Provided: Patient instructed to cont prior HEP.  Exercises were reviewed and Ralf was able to demonstrate them prior to the end of the session.  Ralf demonstrated good  understanding of the HEP provided. See EMR under Patient Instructions for exercises provided during therapy sessions.       Assessment     Pt would continue to benefit from skilled OT. Ralf reported increase in wrist pain with activities today. He is demonstrating improvement in AROM,  and pinch strengths as well.  Tolerated light resistance in short wrist ranges without increase in pain reported.     Ralf is progressing slowly towards his goals and there are no updates to goals at this time. Pt prognosis is Good.     Pt will continue to benefit from skilled outpatient occupational therapy to address the deficits listed in the problem list on initial evaluation provide pt/family education and to maximize pt's level of independence in the home and community environment.     Pt's spiritual, cultural and educational needs considered and pt agreeable to plan of care and goals.    Anticipated barriers to occupational therapy:  None noted at this time  Goals:  Short term goals to be met  in 4-5 weeks (7/18/22)  Patient to be IND with HEP and modalities for pain/edema managment.   Increase right wrist AROM 10 degrees to increase functional hand use for ADLs/work/leisure activities- met 7/13/22.  Increase  strength 10 lbs. to improve functional grasp for ADLs/work/leisure activities- met 7/13/22.   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  Pt will score 38% on Foto survey    PLAN     Use of modalities, manual treatment, and activities to address his ROM and strength issues.  Follow up with ortho on 7/22/22    Updates/Grading for next session: pending improvement in pain.       SANTY Martini

## 2022-07-13 ENCOUNTER — CLINICAL SUPPORT (OUTPATIENT)
Dept: REHABILITATION | Facility: HOSPITAL | Age: 42
End: 2022-07-13
Payer: MEDICAID

## 2022-07-13 DIAGNOSIS — R29.898 DECREASED PINCH STRENGTH: ICD-10-CM

## 2022-07-13 DIAGNOSIS — R29.898 DECREASED GRIP STRENGTH OF RIGHT HAND: Primary | ICD-10-CM

## 2022-07-13 DIAGNOSIS — M25.631 DECREASED RANGE OF MOTION OF RIGHT WRIST: ICD-10-CM

## 2022-07-13 PROCEDURE — 97530 THERAPEUTIC ACTIVITIES: CPT | Mod: PN

## 2022-08-02 NOTE — PROGRESS NOTES
[facility]  Occupational Therapy Progress / Treatment Note    Date: 8/3/2022  Name: Ralf Cuenca  Clinic Number: [id number]    Therapy Diagnosis:   Encounter Diagnoses   Name Primary?    Decreased  strength of right hand Yes    Decreased pinch strength     Decreased range of motion of right wrist      Physician: Bruna Larson PA-C    PPhysician Orders:  Eval and treat with all modalities. Good HEP. 1-2 x per week x 6 weeks.  Medical Diagnosis: Acute pain of right wrist-OT for ulnar sided right wrist pain s/p fall a month ago.  Surgical Procedure and Date: n/a  Evaluation Date: 06/13/2022     Insurance Authorization Period Expiration:9/18/22  Plan of Care Certification Period: 6/13/22 to 8/22/22  Date of Return to MD: 7/22/22     Visit # / Visits authorized: 5/24     Time In: 2:30 pm  Time Out: 3:05 pm  Total Billable Time: 35 minutes     Precautions:  Standard    SUBJECTIVE     Pt reports: My hand is doing better, not really having pain in it.  I went white water rafting and it was sore but not pain.  I got covid and then my hand hasn't really been painful.   was compliant with home exercise program given last session.   Response to previous treatment: to long ago  Functional change: able to paddle rafting    Pain: 0/10 pre therapy ,     Location: right ulnar side of wrist      OBJECTIVE     Ralf received therapeutic exercises/therapeutic activities for 35 minutes including:  Re assessment:                            Left  6/13/22  Right              Right 7/13/22       Right 8/3/22  Wrist Ext/Flex: 75/65     70/50                         70/60                 70/65  Wrist RD/UD: 20/35     20/25                          20/35                    20/40  Supination/Pronation: sym/85 sym/75            Sym/90  Elbow extension/flexion: WFL/WFL       Strength: (QUINN Dynamometer in lbs.) Average 3 trials, Position II         6/13/22               7/13/22       8/3/22  Right:  63.3#                 79.5 #         99.1#  Left: 90.2#      Pinch Strength: (Pinch Gauge in psi's), Average 3 trials  3pt Pinch 6/13/22  R) 9.6 psi's  L) 18.3psi's                  7/13/22  R) 19.3 psi                  8/3/22    R) 20 psi     -red theraband wrist extension, flexion and deviation x 2 sets 10 reps  -ultra gripper setting # 3 removal of dowels from board x 21 reps  -green clip replacement os dowel in board x 21 reps  -magnetic dart throwing x 40 reps with 10 misses, no pain reported with activity                                     Patient Education and Home Exercises      Education provided:   - Progress towards goals     Written Home Exercises Provided: yes  Exercises were reviewed and Ralf was able to demonstrate them prior to the end of the session.  Ralf demonstrated good  understanding of the HEP provided. See EMR under Patient Instructions for exercises provided during therapy sessions.       Assessment     Pt would continue to benefit from skilled OT. Ralf demonstrating increases in AROM, increase in  strength, pinch strength and no pain with activities     Ralf is progressing slowly towards his goals and there are no updates to goals at this time. Pt prognosis is Good.     Pt will continue to benefit from skilled outpatient occupational therapy to address the deficits listed in the problem list on initial evaluation provide pt/family education and to maximize pt's level of independence in the home and community environment.     Pt's spiritual, cultural and educational needs considered and pt agreeable to plan of care and goals.    Anticipated barriers to occupational therapy:  None noted at this time  Goals:  Short term goals to be met in 4-5 weeks (7/18/22)  Patient to be IND with HEP and modalities for pain/edema managment.   Increase right wrist AROM 10 degrees to increase functional hand use for ADLs/work/leisure activities- met 7/13/22.  Increase  strength 10 lbs. to improve  functional grasp for ADLs/work/leisure activities- met 7/13/22.   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  Pt will score 38% on Foto survey    PLAN     Engage in resistive exercises and complete FOTO survey.  Follow up with ortho on 7/22/22    Updates/Grading for next session: pending improvement in pain.       SANTY Martini

## 2022-08-03 ENCOUNTER — CLINICAL SUPPORT (OUTPATIENT)
Dept: REHABILITATION | Facility: HOSPITAL | Age: 42
End: 2022-08-03
Payer: MEDICAID

## 2022-08-03 DIAGNOSIS — R29.898 DECREASED PINCH STRENGTH: ICD-10-CM

## 2022-08-03 DIAGNOSIS — R29.898 DECREASED GRIP STRENGTH OF RIGHT HAND: Primary | ICD-10-CM

## 2022-08-03 DIAGNOSIS — M25.631 DECREASED RANGE OF MOTION OF RIGHT WRIST: ICD-10-CM

## 2022-08-03 PROCEDURE — 97530 THERAPEUTIC ACTIVITIES: CPT | Mod: PN

## 2022-08-05 ENCOUNTER — OFFICE VISIT (OUTPATIENT)
Dept: ORTHOPEDICS | Facility: CLINIC | Age: 42
End: 2022-08-05
Payer: MEDICAID

## 2022-08-05 DIAGNOSIS — M25.531 ACUTE PAIN OF RIGHT WRIST: ICD-10-CM

## 2022-08-05 PROCEDURE — 1159F PR MEDICATION LIST DOCUMENTED IN MEDICAL RECORD: ICD-10-PCS | Mod: CPTII,,, | Performed by: PHYSICIAN ASSISTANT

## 2022-08-05 PROCEDURE — 99213 PR OFFICE/OUTPT VISIT, EST, LEVL III, 20-29 MIN: ICD-10-PCS | Mod: S$PBB,,, | Performed by: PHYSICIAN ASSISTANT

## 2022-08-05 PROCEDURE — 99999 PR PBB SHADOW E&M-EST. PATIENT-LVL II: CPT | Mod: PBBFAC,,, | Performed by: PHYSICIAN ASSISTANT

## 2022-08-05 PROCEDURE — 1160F PR REVIEW ALL MEDS BY PRESCRIBER/CLIN PHARMACIST DOCUMENTED: ICD-10-PCS | Mod: CPTII,,, | Performed by: PHYSICIAN ASSISTANT

## 2022-08-05 PROCEDURE — 1160F RVW MEDS BY RX/DR IN RCRD: CPT | Mod: CPTII,,, | Performed by: PHYSICIAN ASSISTANT

## 2022-08-05 PROCEDURE — 99212 OFFICE O/P EST SF 10 MIN: CPT | Mod: PBBFAC,PN | Performed by: PHYSICIAN ASSISTANT

## 2022-08-05 PROCEDURE — 99213 OFFICE O/P EST LOW 20 MIN: CPT | Mod: S$PBB,,, | Performed by: PHYSICIAN ASSISTANT

## 2022-08-05 PROCEDURE — 1159F MED LIST DOCD IN RCRD: CPT | Mod: CPTII,,, | Performed by: PHYSICIAN ASSISTANT

## 2022-08-05 PROCEDURE — 99999 PR PBB SHADOW E&M-EST. PATIENT-LVL II: ICD-10-PCS | Mod: PBBFAC,,, | Performed by: PHYSICIAN ASSISTANT

## 2022-08-05 RX ORDER — IBUPROFEN 800 MG/1
800 TABLET ORAL
Qty: 90 TABLET | Refills: 2 | Status: SHIPPED | OUTPATIENT
Start: 2022-08-05 | End: 2022-08-05 | Stop reason: SDUPTHER

## 2022-08-05 RX ORDER — IBUPROFEN 800 MG/1
800 TABLET ORAL
Qty: 90 TABLET | Refills: 2 | Status: SHIPPED | OUTPATIENT
Start: 2022-08-05

## 2022-08-09 NOTE — PROGRESS NOTES
[facility]  Occupational Therapy Treatment / Discharge  Note     Date: 8/12/2022  Name: Ralf Cuenca  Clinic Number: [id number]    Therapy Diagnosis:   Encounter Diagnoses   Name Primary?    Decreased  strength of right hand Yes    Decreased pinch strength     Decreased range of motion of right wrist      Physician: Bruna Larson PA-C    PPhysician Orders:  Eval and treat with all modalities. Good HEP. 1-2 x per week x 6 weeks.  Medical Diagnosis: Acute pain of right wrist-OT for ulnar sided right wrist pain s/p fall a month ago.  Surgical Procedure and Date: n/a  Evaluation Date: 06/13/2022     Insurance Authorization Period Expiration:9/18/22  Plan of Care Certification Period: 6/13/22 to 8/22/22  Date of Return to MD: 7/22/22     Visit # / Visits authorized: 6/24     Time In: 7:13 am  Time Out: 7:38 am  Total Billable Time: 25 minutes     Precautions:  Standard    Date of Last visit: 8/12/22  Total Visits Received: 6    SUBJECTIVE     Pt reports: I had some pain in my wrist after vacuuming out my girlfriends care.  Ibuprofen took care of it.    was compliant with home exercise program given last session.   Response to previous treatment: no adverse affects  Functional change:     Pain: 0/10 pre therapy      Location: right ulnar side of wrist      OBJECTIVE     Ralf received therapeutic exercises/therapeutic activities for 25 minutes including:     RE assessment:                            Left  6/13/22  Right              Right 7/13/22      Right 8/12/22  Wrist Ext/Flex: 75/65     70/50                         70/60                 70/65  Wrist RD/UD: 20/35     20/25                          20/35                  20/35  Supination/Pronation: sym/85 sym/75            Sym/90              Sym/90  Elbow extension/flexion: WFL/WFL       Strength: (QUINN Dynamometer in lbs.) Average 3 trials, Position II         6/13/22 7/13/22 8/12/22  Right:  63.3#                 79.5 #              117.1#  Left: 90.2#      Pinch Strength: (Pinch Gauge in psi's), Average 3 trials  3pt Pinch 6/13/22  R) 9.6 psi's  L) 18.3psi's                  7/13/22  R) 19.3 psi                   8/12/22 R)  23.0 pis    -wrist curl up bar 3# x 3 reps each direction  -hammer twist 3# x 10 reps  -green theraband wrist ext/flex/deviation x 10 reps each                                     Patient Education and Home Exercises      Education provided:   - Progress towards goals   -provided upgrade to green theraband    Written Home Exercises Provided: continue with HEP provided  Exercises were reviewed and Ralf was able to demonstrate them prior to the end of the session.  Ralf demonstrated good  understanding of the HEP provided. See EMR under Patient Instructions for exercises provided during therapy sessions.       Assessment     Ralf demonstrating increased   strength and  pinch strength.  He has had no significant pain in his wrist/ hand with activities.  He has also achieved all therapy goals. He has returned to all regular activities.    Pt's spiritual, cultural and educational needs considered and pt agreeable to plan of care and goals.    Anticipated barriers to occupational therapy:  None noted at this time    Goals:  Short term goals to be met in 4-5 weeks (7/18/22)  Patient to be IND with HEP and modalities for pain/edema managment.-met   Increase right wrist AROM 10 degrees to increase functional hand use for ADLs/work/leisure activities- met 7/13/22.  Increase  strength 10 lbs. to improve functional grasp for ADLs/work/leisure activities- met 7/13/22.   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  Pt will score 38% on Foto survey - met 8/12/22    Discharge reason: Patient has met all of his/her goals    Discharge FOTO Score: 38%      PLAN     This patient is discharged from Occupational Therapy    SANTY Martini

## 2022-08-12 ENCOUNTER — CLINICAL SUPPORT (OUTPATIENT)
Dept: REHABILITATION | Facility: HOSPITAL | Age: 42
End: 2022-08-12
Payer: MEDICAID

## 2022-08-12 DIAGNOSIS — R29.898 DECREASED PINCH STRENGTH: ICD-10-CM

## 2022-08-12 DIAGNOSIS — M25.631 DECREASED RANGE OF MOTION OF RIGHT WRIST: ICD-10-CM

## 2022-08-12 DIAGNOSIS — R29.898 DECREASED GRIP STRENGTH OF RIGHT HAND: Primary | ICD-10-CM

## 2022-08-12 PROCEDURE — 97530 THERAPEUTIC ACTIVITIES: CPT | Mod: PN

## 2023-06-16 ENCOUNTER — PATIENT MESSAGE (OUTPATIENT)
Dept: PODIATRY | Facility: CLINIC | Age: 43
End: 2023-06-16
Payer: MEDICAID